# Patient Record
Sex: MALE
[De-identification: names, ages, dates, MRNs, and addresses within clinical notes are randomized per-mention and may not be internally consistent; named-entity substitution may affect disease eponyms.]

---

## 2022-12-27 ENCOUNTER — APPOINTMENT (OUTPATIENT)
Dept: ORTHOPEDIC SURGERY | Facility: CLINIC | Age: 67
End: 2022-12-27
Payer: MEDICARE

## 2022-12-27 VITALS — WEIGHT: 215 LBS | BODY MASS INDEX: 30.78 KG/M2 | HEIGHT: 70 IN

## 2022-12-27 DIAGNOSIS — S33.5XXA SPRAIN OF LIGAMENTS OF LUMBAR SPINE, INITIAL ENCOUNTER: ICD-10-CM

## 2022-12-27 PROCEDURE — 99203 OFFICE O/P NEW LOW 30 MIN: CPT

## 2022-12-27 PROCEDURE — 73030 X-RAY EXAM OF SHOULDER: CPT | Mod: RT

## 2022-12-27 PROCEDURE — 72100 X-RAY EXAM L-S SPINE 2/3 VWS: CPT

## 2022-12-27 RX ORDER — METHYLPREDNISOLONE 4 MG/1
4 TABLET ORAL
Qty: 1 | Refills: 1 | Status: ACTIVE | COMMUNITY
Start: 2022-12-27 | End: 1900-01-01

## 2022-12-27 NOTE — PHYSICAL EXAM
[Normal Mood and Affect] : normal mood and affect [Able to Communicate] : able to communicate [Well Developed] : well developed [Well Nourished] : well nourished [NL (45)] : forward flexion 45 degrees [NL (90)] : forward flexion 90 degrees [NL (30)] : extension 30 degrees [Extension] : extension [Bending to right] : bending to right [Right] : right shoulder [Left] : left shoulder [NL (0-180)] : full passive abduction 0-180 degrees [NL (0-90)] : full external rotation 0-90 degrees [Bilateral] : shoulder bilaterally [Type 2 acromion] : Type 2 acromion [Facet arthropathy] : Facet arthropathy [Disc space narrowing] : Disc space narrowing [de-identified] : extension 20 degrees [de-identified] : left lateral bending 30 degrees [de-identified] : right lateral bending 20 degrees [] : negative impingement testing [FreeTextEntry9] : IR T10 [de-identified] : +Tejinder [FreeTextEntry1] : Normal right shoulder.  Narrowing acromiohumeral interval on the left.

## 2022-12-27 NOTE — HISTORY OF PRESENT ILLNESS
[Lower back] : lower back [Left Arm] : left arm [Right Arm] : right arm [Gradual] : gradual [Sudden] : sudden [7] : 7 [4] : 4 [Burning] : burning [Dull/Aching] : dull/aching [Constant] : constant [Rest] : rest [Heat] : heat [Exercising] : exercising [Full time] : Work status: full time [de-identified] : 12/27/22  Initial visit for this 67 year male RHD here today for bilateral shoulder pain for about one year with the right one worsening after a fall about six weeks ago.  Able to raise both arms, but painful.  They loosen after a hot shower.  Cannot laying on his arms.  Takes Motrin for pain.  \par \par Also complaining of left lower back pain for the last week.  Feels stiffness.   Pain scale of 5.   Did have back surgery in 2009, for a disc.  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

## 2023-01-06 ENCOUNTER — APPOINTMENT (OUTPATIENT)
Dept: ORTHOPEDIC SURGERY | Facility: CLINIC | Age: 68
End: 2023-01-06
Payer: MEDICARE

## 2023-01-06 VITALS — HEIGHT: 70 IN | WEIGHT: 215 LBS | BODY MASS INDEX: 30.78 KG/M2

## 2023-01-06 DIAGNOSIS — S33.5XXD SPRAIN OF LIGAMENTS OF LUMBAR SPINE, SUBSEQUENT ENCOUNTER: ICD-10-CM

## 2023-01-06 DIAGNOSIS — Z78.9 OTHER SPECIFIED HEALTH STATUS: ICD-10-CM

## 2023-01-06 DIAGNOSIS — M25.811 OTHER SPECIFIED JOINT DISORDERS, RIGHT SHOULDER: ICD-10-CM

## 2023-01-06 DIAGNOSIS — M25.812 OTHER SPECIFIED JOINT DISORDERS, LEFT SHOULDER: ICD-10-CM

## 2023-01-06 DIAGNOSIS — M51.36 OTHER INTERVERTEBRAL DISC DEGENERATION, LUMBAR REGION: ICD-10-CM

## 2023-01-06 DIAGNOSIS — M47.816 SPONDYLOSIS W/OUT MYELOPATHY OR RADICULOPATHY, LUMBAR REGION: ICD-10-CM

## 2023-01-06 PROCEDURE — 99213 OFFICE O/P EST LOW 20 MIN: CPT | Mod: 25

## 2023-01-06 PROCEDURE — 20610 DRAIN/INJ JOINT/BURSA W/O US: CPT | Mod: 50

## 2023-01-06 NOTE — PHYSICAL EXAM
[Normal Mood and Affect] : normal mood and affect [Able to Communicate] : able to communicate [Well Developed] : well developed [Well Nourished] : well nourished [NL (45)] : forward flexion 45 degrees [NL (90)] : forward flexion 90 degrees [NL (30)] : extension 30 degrees [Extension] : extension [Bending to right] : bending to right [Facet arthropathy] : Facet arthropathy [Disc space narrowing] : Disc space narrowing [Right] : right shoulder [Left] : left shoulder [NL (0-180)] : full passive abduction 0-180 degrees [NL (0-90)] : full external rotation 0-90 degrees [Bilateral] : shoulder bilaterally [Type 2 acromion] : Type 2 acromion [de-identified] : extension 20 degrees [de-identified] : left lateral bending 30 degrees [de-identified] : right lateral bending 20 degrees [] : negative impingement testing [FreeTextEntry9] : IR T10 [de-identified] : +Tejinder [FreeTextEntry1] : Normal right shoulder.  Narrowing acromiohumeral interval on the left.

## 2023-01-06 NOTE — HISTORY OF PRESENT ILLNESS
[0] : 0 [Burning] : burning [Dull/Aching] : dull/aching [Full time] : Work status: full time [de-identified] : 01/06/23:  Returns today for cortisone injections to both shoulders.  Says the MDP helped his back pain some.  He was still working while on it. \par \par 12/27/22  Initial visit for this 67 year male RHD here today for bilateral shoulder pain for about one year with the right one worsening after a fall about six weeks ago.  Able to raise both arms, but painful.  They loosen after a hot shower.  Cannot laying on his arms.  Takes Motrin for pain.  \par \par Also complaining of left lower back pain for the last week.  Feels stiffness.   Pain scale of 5.   Did have back surgery in 2009, for a disc.  [FreeTextEntry1] : bilateral shoulder  [de-identified] : none [de-identified] : self

## 2023-01-06 NOTE — PROCEDURE
[Large Joint Injection] : Large joint injection [Bilateral] : bilaterally of the [Subacromial Space] : subacromial space [Pain] : pain [Inflammation] : inflammation [Betadine] : betadine [Ethyl Chloride sprayed topically] : ethyl chloride sprayed topically [___ cc    1%] : Lidocaine ~Vcc of 1%  [___ cc    40mg] : Methylprednisolone (Depomedrol) ~Vcc of 40 mg  [] : Patient tolerated procedure well [Call if redness, pain or fever occur] : call if redness, pain or fever occur [Apply ice for 15min out of every hour for the next 12-24 hours as tolerated] : apply ice for 15 minutes out of every hour for the next 12-24 hours as tolerated [Risks, benefits, alternatives discussed / Verbal consent obtained] : the risks benefits, and alternatives have been discussed, and verbal consent was obtained